# Patient Record
Sex: FEMALE | Race: WHITE | NOT HISPANIC OR LATINO | URBAN - METROPOLITAN AREA
[De-identification: names, ages, dates, MRNs, and addresses within clinical notes are randomized per-mention and may not be internally consistent; named-entity substitution may affect disease eponyms.]

---

## 2023-02-24 ENCOUNTER — NEW REFERRAL (OUTPATIENT)
Dept: URBAN - METROPOLITAN AREA CLINIC 103 | Facility: CLINIC | Age: 48
End: 2023-02-24

## 2023-02-24 VITALS — DIASTOLIC BLOOD PRESSURE: 78 MMHG | SYSTOLIC BLOOD PRESSURE: 106 MMHG

## 2023-02-24 DIAGNOSIS — H35.722: ICD-10-CM

## 2023-02-24 PROCEDURE — 92134 CPTRZ OPH DX IMG PST SGM RTA: CPT

## 2023-02-24 PROCEDURE — 99203 OFFICE O/P NEW LOW 30 MIN: CPT

## 2023-02-24 PROCEDURE — 92250 FUNDUS PHOTOGRAPHY W/I&R: CPT

## 2023-02-24 PROCEDURE — 92202 OPSCPY EXTND ON/MAC DRAW: CPT

## 2023-02-24 ASSESSMENT — VISUAL ACUITY
OD_CC: 20/20
OS_CC: 20/20
OD_CC: 20/20
OS_CC: 20/25

## 2023-02-24 ASSESSMENT — TONOMETRY
OS_IOP_MMHG: 12
OD_IOP_MMHG: 11

## 2023-06-17 ENCOUNTER — APPOINTMENT (EMERGENCY)
Dept: RADIOLOGY | Facility: HOSPITAL | Age: 48
End: 2023-06-17
Payer: COMMERCIAL

## 2023-06-17 ENCOUNTER — HOSPITAL ENCOUNTER (EMERGENCY)
Facility: HOSPITAL | Age: 48
Discharge: HOME | End: 2023-06-17
Attending: EMERGENCY MEDICINE
Payer: COMMERCIAL

## 2023-06-17 VITALS
HEART RATE: 80 BPM | TEMPERATURE: 96.3 F | HEIGHT: 64 IN | SYSTOLIC BLOOD PRESSURE: 131 MMHG | DIASTOLIC BLOOD PRESSURE: 83 MMHG | OXYGEN SATURATION: 100 % | WEIGHT: 148.6 LBS | RESPIRATION RATE: 18 BRPM | BODY MASS INDEX: 25.37 KG/M2
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
## 2023-06-17 DIAGNOSIS — S01.81XA FACIAL LACERATION, INITIAL ENCOUNTER: ICD-10-CM

## 2023-06-17 PROCEDURE — 72125 CT NECK SPINE W/O DYE: CPT

## 2023-06-17 PROCEDURE — 70450 CT HEAD/BRAIN W/O DYE: CPT

## 2023-06-17 PROCEDURE — G1004 CDSM NDSC: HCPCS

## 2023-06-17 PROCEDURE — 99284 EMERGENCY DEPT VISIT MOD MDM: CPT

## 2023-06-17 RX ORDER — LIDOCAINE HYDROCHLORIDE AND EPINEPHRINE 10; 10 MG/ML; UG/ML
10 INJECTION, SOLUTION INFILTRATION; PERINEURAL ONCE
Status: COMPLETED | OUTPATIENT
Start: 2023-06-17 | End: 2023-06-17

## 2023-06-17 RX ORDER — GINSENG 100 MG
1 CAPSULE ORAL ONCE
Status: COMPLETED | OUTPATIENT
Start: 2023-06-18 | End: 2023-06-17

## 2023-06-17 RX ADMIN — LIDOCAINE HYDROCHLORIDE,EPINEPHRINE BITARTRATE 10 ML: 10; .01 INJECTION, SOLUTION INFILTRATION; PERINEURAL at 22:54

## 2023-06-17 RX ADMIN — BACITRACIN 1 SMALL APPLICATION: 500 OINTMENT TOPICAL at 23:54

## 2023-06-18 NOTE — ED PROVIDER NOTES
History  Chief Complaint   Patient presents with   • Fall     States she was on her way to bathroom and fell, states she has been drinking  No LOC  Laceration left outer eyelid extending out and irregular  • Facial Laceration     42 yo female fell walking to bathroom, landed on left side of face onto hardwood floor  No LOC  C/o laceration  No recent illness  No neck or back or chest or belly pain  Fall  Associated symptoms: no vomiting        None       History reviewed  No pertinent past medical history  History reviewed  No pertinent surgical history  History reviewed  No pertinent family history  I have reviewed and agree with the history as documented  E-Cigarette/Vaping   • E-Cigarette Use Never User      E-Cigarette/Vaping Substances     Social History     Tobacco Use   • Smoking status: Never   • Smokeless tobacco: Never   Vaping Use   • Vaping Use: Never used   Substance Use Topics   • Alcohol use: Yes     Comment: monthly 4-5 drinks   • Drug use: Never       Review of Systems   Constitutional: Negative for fever  Respiratory: Negative for cough  Gastrointestinal: Negative for diarrhea and vomiting  Skin: Positive for wound  Physical Exam  Physical Exam  Vitals and nursing note reviewed  Constitutional:       General: She is not in acute distress  Appearance: She is not ill-appearing  HENT:      Head: Normocephalic  Comments: Left lateral eyelid lac about 10 cm with flap portion, no FB, no sig  sts  Eyes:      Extraocular Movements: Extraocular movements intact  Pupils: Pupils are equal, round, and reactive to light  Pulmonary:      Effort: Pulmonary effort is normal  No respiratory distress  Musculoskeletal:         General: Normal range of motion  Cervical back: Normal range of motion and neck supple  No tenderness  Skin:     General: Skin is warm and dry  Neurological:      General: No focal deficit present        Mental Status: She is alert and oriented to person, place, and time  Gait: Gait normal    Psychiatric:         Mood and Affect: Mood normal          Behavior: Behavior normal              Vital Signs  ED Triage Vitals [06/17/23 2143]   Temperature Pulse Respirations Blood Pressure SpO2   (!) 96 3 °F (35 7 °C) 80 18 131/83 100 %      Temp Source Heart Rate Source Patient Position - Orthostatic VS BP Location FiO2 (%)   Tympanic Monitor Sitting Left arm --      Pain Score       4           Vitals:    06/17/23 2143   BP: 131/83   Pulse: 80   Patient Position - Orthostatic VS: Sitting         Visual Acuity  Visual Acuity    Flowsheet Row Most Recent Value   L Pupil Size (mm) 5          ED Medications  Medications   lidocaine-epinephrine (XYLOCAINE/EPINEPHRINE) 1 %-1:100,000 injection 10 mL (10 mL Infiltration Given 6/17/23 2254)   bacitracin topical ointment 1 small application (1 small application Topical Given 6/17/23 2354)       Diagnostic Studies  Results Reviewed     None                 CT head without contrast   Final Result by Pelon Oliva DO (06/17 2324)      No acute intracranial abnormality  Workstation performed: [id number]         CT cervical spine without contrast   Final Result by Pelon Oliva DO (06/17 2330)      No cervical spine fracture or traumatic malalignment  Workstation performed: [id number]                    Procedures  Universal Protocol:  Consent: Verbal consent obtained  Consent given by: patient    Laceration repair    Date/Time: 6/17/2023 11:45 PM    Performed by: Azeem Monroe MD  Authorized by: Azeem Monroe MD  Laceration length: 10 cm  Contaminated: none    Foreign bodies: no foreign bodies  Tendon involvement: none  Nerve involvement: none  Vascular damage: no  Anesthesia: local infiltration    Anesthesia:  Local Anesthetic: lidocaine 1% with epinephrine    Sedation:  Patient sedated: no      Wound Dehiscence:  Superficial Wound Dehiscence: simple closure      Procedure Details:  Preparation: Patient was prepped and draped in the usual sterile fashion  Amount of cleaning: standard  Skin closure: 5-0 nylon  Number of sutures: 17  Technique: simple  Approximation: close  Approximation difficulty: simple  Patient tolerance: patient tolerated the procedure well with no immediate complications               ED Course                               SBIRT 22yo+    Flowsheet Row Most Recent Value   Initial Alcohol Screen: US AUDIT-C     1  How often do you have a drink containing alcohol? 2 Filed at: 06/17/2023 2147   2  How many drinks containing alcohol do you have on a typical day you are drinking? 3 Filed at: 06/17/2023 2147   3b  FEMALE Any Age, or MALE 65+: How often do you have 4 or more drinks on one occassion? 2 Filed at: 06/17/2023 2147   Audit-C Score 7 Filed at: 06/17/2023 2147   GRGE: How many times in the past year have you    Used an illegal drug or used a prescription medication for non-medical reasons? Never Filed at: 06/17/2023 2147                    Medical Decision Making  I offered plastic surgeon and contacted several   Two declined and one offered to see her at [facility]  Pt  Refused to go to [facility] and just wants me to close wound  Advised there will be a scar  Pt  Also refuses tetanus booster  CT scan head and neck ok  Advised of wound care  Amount and/or Complexity of Data Reviewed  Radiology: ordered  Risk  OTC drugs  Prescription drug management  Disposition  Final diagnoses:   Fall from standing, initial encounter   Facial laceration, initial encounter     Time reflects when diagnosis was documented in both MDM as applicable and the Disposition within this note     Time User Action Codes Description Comment    0/13/7608 33:02 PM Malissa Davalos Add [M40  XXXA] Fall from standing, initial encounter     9/84/9612 56:12 PM Rashida Chairez Add [H47 03LN] Facial laceration, initial encounter       ED Disposition     ED Disposition   Discharge    Condition   Stable    Date/Time   Sat Jun 17, 2023 11:50 PM    Comment   Josefina Renee discharge to home/self care  Follow-up Information     Follow up With Specialties Details Why Contact Info    your doctor   5-7 days for suture removal           There are no discharge medications for this patient  No discharge procedures on file      PDMP Review     None          ED Provider  Electronically Signed by           Lottie Zhang MD  22/39/57 [id number]